# Patient Record
Sex: FEMALE | Race: WHITE
[De-identification: names, ages, dates, MRNs, and addresses within clinical notes are randomized per-mention and may not be internally consistent; named-entity substitution may affect disease eponyms.]

---

## 2018-08-19 ENCOUNTER — HOSPITAL ENCOUNTER (EMERGENCY)
Dept: HOSPITAL 4 - SED | Age: 38
Discharge: HOME | End: 2018-08-19
Payer: COMMERCIAL

## 2018-08-19 VITALS — BODY MASS INDEX: 28.93 KG/M2 | WEIGHT: 180 LBS | HEIGHT: 66 IN

## 2018-08-19 VITALS — SYSTOLIC BLOOD PRESSURE: 146 MMHG

## 2018-08-19 VITALS — SYSTOLIC BLOOD PRESSURE: 115 MMHG

## 2018-08-19 DIAGNOSIS — Z88.2: ICD-10-CM

## 2018-08-19 DIAGNOSIS — I10: ICD-10-CM

## 2018-08-19 DIAGNOSIS — N39.0: Primary | ICD-10-CM

## 2018-08-19 LAB
ALBUMIN SERPL BCP-MCNC: 3.8 G/DL (ref 3.4–4.8)
ALT SERPL W P-5'-P-CCNC: 51 U/L (ref 12–78)
ANION GAP SERPL CALCULATED.3IONS-SCNC: 8 MMOL/L (ref 5–15)
APPEARANCE UR: (no result)
AST SERPL W P-5'-P-CCNC: 51 U/L (ref 10–37)
BACTERIA URNS QL MICRO: (no result) /HPF
BASOPHILS # BLD AUTO: 0.1 K/UL (ref 0–0.2)
BASOPHILS NFR BLD AUTO: 1.7 % (ref 0–2)
BILIRUB SERPL-MCNC: 0.3 MG/DL (ref 0–1)
BILIRUB UR QL STRIP: NEGATIVE
BUN SERPL-MCNC: 9 MG/DL (ref 8–21)
CALCIUM SERPL-MCNC: 9.1 MG/DL (ref 8.4–11)
CHLORIDE SERPL-SCNC: 104 MMOL/L (ref 98–107)
COLOR UR: (no result)
CREAT SERPL-MCNC: 0.65 MG/DL (ref 0.55–1.3)
EOSINOPHIL # BLD AUTO: 0.1 K/UL (ref 0–0.4)
EOSINOPHIL NFR BLD AUTO: 1.9 % (ref 0–4)
ERYTHROCYTE [DISTWIDTH] IN BLOOD BY AUTOMATED COUNT: 12 % (ref 9–15)
GFR SERPL CREATININE-BSD FRML MDRD: 133 ML/MIN (ref 90–?)
GLUCOSE SERPL-MCNC: 95 MG/DL (ref 70–99)
GLUCOSE UR STRIP-MCNC: NEGATIVE MG/DL
HCT VFR BLD AUTO: 38.2 % (ref 36–48)
HGB BLD-MCNC: 12.8 G/DL (ref 12–16)
HGB UR QL STRIP: (no result)
KETONES UR STRIP-MCNC: NEGATIVE MG/DL
LEUKOCYTE ESTERASE UR QL STRIP: (no result)
LIPASE SERPL-CCNC: 189 U/L (ref 73–393)
LYMPHOCYTES # BLD AUTO: 1.4 K/UL (ref 1–5.5)
LYMPHOCYTES NFR BLD AUTO: 32.3 % (ref 20.5–51.5)
MCH RBC QN AUTO: 32 PG (ref 27–31)
MCHC RBC AUTO-ENTMCNC: 34 % (ref 32–36)
MCV RBC AUTO: 95 FL (ref 79–98)
MONOCYTES # BLD MANUAL: 0.4 K/UL (ref 0–1)
MONOCYTES # BLD MANUAL: 9.2 % (ref 1.7–9.3)
NEUTROPHILS # BLD AUTO: 2.4 K/UL (ref 1.8–7.7)
NEUTROPHILS NFR BLD AUTO: 54.9 % (ref 40–70)
NITRITE UR QL STRIP: NEGATIVE
PH UR STRIP: 6 [PH] (ref 5–8)
PLATELET # BLD AUTO: 146 K/UL (ref 130–430)
POTASSIUM SERPL-SCNC: 3.6 MMOL/L (ref 3.5–5.1)
PROT UR QL STRIP: (no result)
RBC # BLD AUTO: 4.03 MIL/UL (ref 4.2–6.2)
RBC #/AREA URNS HPF: >100 /HPF (ref 0–3)
SODIUM SERPLBLD-SCNC: 138 MMOL/L (ref 136–145)
SP GR UR STRIP: 1.02 (ref 1–1.03)
UROBILINOGEN UR STRIP-MCNC: 1 MG/DL (ref 0.2–1)
WBC # BLD AUTO: 4.4 K/UL (ref 4.8–10.8)
WBC #/AREA URNS HPF: >100 /HPF (ref 0–3)

## 2018-08-19 PROCEDURE — 76856 US EXAM PELVIC COMPLETE: CPT

## 2018-08-19 PROCEDURE — 85025 COMPLETE CBC W/AUTO DIFF WBC: CPT

## 2018-08-19 PROCEDURE — 36415 COLL VENOUS BLD VENIPUNCTURE: CPT

## 2018-08-19 PROCEDURE — 87086 URINE CULTURE/COLONY COUNT: CPT

## 2018-08-19 PROCEDURE — 80053 COMPREHEN METABOLIC PANEL: CPT

## 2018-08-19 PROCEDURE — 81000 URINALYSIS NONAUTO W/SCOPE: CPT

## 2018-08-19 PROCEDURE — 99285 EMERGENCY DEPT VISIT HI MDM: CPT

## 2018-08-19 PROCEDURE — 96365 THER/PROPH/DIAG IV INF INIT: CPT

## 2018-08-19 PROCEDURE — 83690 ASSAY OF LIPASE: CPT

## 2018-08-19 NOTE — NUR
Patient brought to ER by [facility] C/O waking up with RLQ abdominal pain 2-8/10 
radiating to pelvis and lower back, sharp in nature. Also C/O nausea and the 
feeling of passing out. Patient had D&C 8/13 for miscariage of 9.5 week 
pregnancy. Also states that she is taking nitrofurantoin for UTI. Denies 
vomiting/diarrhea/constipation, AAOx4, unlabored breathing, no signs of acute 
distress.

## 2018-08-19 NOTE — NUR
# 22 gauge angiocath placed to left hand.  Use of asceptic technique.  Opsite 
placed over site.  Blood return noted.  Blood for lab drawn from site.  Flushed 
with 10 cc of normal saline.  No evidence of infiltration noted.  Patient 
tolerated well.

## 2018-08-19 NOTE — NUR
Patient given written and verbal discharge instructions and verbalizes 
understanding.  ER MD discussed with patient the results and treatment 
provided. Patient in stable condition. ID arm band removed. IV catheter removed 
intact and dressing applied, no active bleeding.

Rx of Norco and Cipro  given. Patient educated on pain management and to follow 
up with PMD. Pain Scale 0/10

Opportunity for questions provided and answered. Medication side effect fact 
sheet provided.